# Patient Record
Sex: FEMALE | Race: WHITE | Employment: OTHER | URBAN - METROPOLITAN AREA
[De-identification: names, ages, dates, MRNs, and addresses within clinical notes are randomized per-mention and may not be internally consistent; named-entity substitution may affect disease eponyms.]

---

## 2022-03-15 ENCOUNTER — APPOINTMENT (OUTPATIENT)
Dept: GENERAL RADIOLOGY | Age: 67
End: 2022-03-15
Payer: COMMERCIAL

## 2022-03-15 ENCOUNTER — HOSPITAL ENCOUNTER (EMERGENCY)
Age: 67
Discharge: HOME | End: 2022-03-16
Attending: EMERGENCY MEDICINE
Payer: COMMERCIAL

## 2022-03-15 VITALS
RESPIRATION RATE: 18 BRPM | TEMPERATURE: 98.4 F | SYSTOLIC BLOOD PRESSURE: 133 MMHG | DIASTOLIC BLOOD PRESSURE: 56 MMHG | HEART RATE: 84 BPM | OXYGEN SATURATION: 94 %

## 2022-03-15 DIAGNOSIS — S62.101A RIGHT WRIST FRACTURE, CLOSED, INITIAL ENCOUNTER: Primary | ICD-10-CM

## 2022-03-15 PROCEDURE — 73100 X-RAY EXAM OF WRIST: CPT

## 2022-03-15 PROCEDURE — 6360000002 HC RX W HCPCS: Performed by: PHYSICIAN ASSISTANT

## 2022-03-15 PROCEDURE — 96372 THER/PROPH/DIAG INJ SC/IM: CPT

## 2022-03-15 PROCEDURE — 2500000003 HC RX 250 WO HCPCS: Performed by: EMERGENCY MEDICINE

## 2022-03-15 PROCEDURE — 2500000003 HC RX 250 WO HCPCS: Performed by: PHYSICIAN ASSISTANT

## 2022-03-15 PROCEDURE — 99283 EMERGENCY DEPT VISIT LOW MDM: CPT

## 2022-03-15 PROCEDURE — 6360000002 HC RX W HCPCS: Performed by: EMERGENCY MEDICINE

## 2022-03-15 RX ORDER — LIDOCAINE HYDROCHLORIDE 20 MG/ML
5 INJECTION, SOLUTION INFILTRATION; PERINEURAL ONCE
Status: COMPLETED | OUTPATIENT
Start: 2022-03-15 | End: 2022-03-15

## 2022-03-15 RX ORDER — FENTANYL CITRATE 50 UG/ML
50 INJECTION, SOLUTION INTRAMUSCULAR; INTRAVENOUS ONCE
Status: DISCONTINUED | OUTPATIENT
Start: 2022-03-15 | End: 2022-03-15

## 2022-03-15 RX ORDER — HYDROMORPHONE HCL 110MG/55ML
2 PATIENT CONTROLLED ANALGESIA SYRINGE INTRAVENOUS ONCE
Status: COMPLETED | OUTPATIENT
Start: 2022-03-15 | End: 2022-03-15

## 2022-03-15 RX ORDER — LIDOCAINE HYDROCHLORIDE 10 MG/ML
10 INJECTION, SOLUTION EPIDURAL; INFILTRATION; INTRACAUDAL; PERINEURAL ONCE
Status: COMPLETED | OUTPATIENT
Start: 2022-03-15 | End: 2022-03-15

## 2022-03-15 RX ADMIN — LIDOCAINE HYDROCHLORIDE 10 ML: 10 INJECTION, SOLUTION EPIDURAL; INFILTRATION; INTRACAUDAL; PERINEURAL at 23:51

## 2022-03-15 RX ADMIN — HYDROMORPHONE HYDROCHLORIDE 2 MG: 2 INJECTION, SOLUTION INTRAMUSCULAR; INTRAVENOUS; SUBCUTANEOUS at 21:30

## 2022-03-15 RX ADMIN — LIDOCAINE HYDROCHLORIDE 5 ML: 20 INJECTION, SOLUTION INFILTRATION; PERINEURAL at 20:29

## 2022-03-15 RX ADMIN — HYDROMORPHONE HYDROCHLORIDE 2 MG: 2 INJECTION, SOLUTION INTRAMUSCULAR; INTRAVENOUS; SUBCUTANEOUS at 20:28

## 2022-03-15 ASSESSMENT — PAIN SCALES - WONG BAKER: WONGBAKER_NUMERICALRESPONSE: 10

## 2022-03-15 ASSESSMENT — PAIN - FUNCTIONAL ASSESSMENT
PAIN_FUNCTIONAL_ASSESSMENT: 0-10
PAIN_FUNCTIONAL_ASSESSMENT: PREVENTS OR INTERFERES WITH MANY ACTIVE NOT PASSIVE ACTIVITIES

## 2022-03-15 ASSESSMENT — PAIN SCALES - GENERAL
PAINLEVEL_OUTOF10: 10

## 2022-03-15 ASSESSMENT — PAIN DESCRIPTION - FREQUENCY: FREQUENCY: CONTINUOUS

## 2022-03-15 ASSESSMENT — PAIN DESCRIPTION - PAIN TYPE: TYPE: ACUTE PAIN

## 2022-03-15 ASSESSMENT — PAIN DESCRIPTION - ONSET: ONSET: PROGRESSIVE

## 2022-03-15 ASSESSMENT — PAIN DESCRIPTION - LOCATION: LOCATION: WRIST

## 2022-03-15 ASSESSMENT — PAIN DESCRIPTION - PROGRESSION: CLINICAL_PROGRESSION: NOT CHANGED

## 2022-03-15 ASSESSMENT — PAIN DESCRIPTION - ORIENTATION: ORIENTATION: RIGHT

## 2022-03-15 ASSESSMENT — PAIN DESCRIPTION - DESCRIPTORS: DESCRIPTORS: CONSTANT

## 2022-03-16 PROCEDURE — 6370000000 HC RX 637 (ALT 250 FOR IP): Performed by: PHYSICIAN ASSISTANT

## 2022-03-16 RX ORDER — OXYCODONE HYDROCHLORIDE 5 MG/1
5 TABLET ORAL EVERY 6 HOURS PRN
Qty: 20 TABLET | Refills: 0 | Status: SHIPPED | OUTPATIENT
Start: 2022-03-16 | End: 2022-03-21

## 2022-03-16 RX ORDER — ONDANSETRON 4 MG/1
4 TABLET, ORALLY DISINTEGRATING ORAL ONCE
Status: COMPLETED | OUTPATIENT
Start: 2022-03-16 | End: 2022-03-16

## 2022-03-16 RX ORDER — ONDANSETRON 4 MG/1
TABLET, ORALLY DISINTEGRATING ORAL
Status: DISCONTINUED
Start: 2022-03-16 | End: 2022-03-16 | Stop reason: HOSPADM

## 2022-03-16 RX ADMIN — ONDANSETRON 4 MG: 4 TABLET, ORALLY DISINTEGRATING ORAL at 00:14

## 2022-03-16 ASSESSMENT — ENCOUNTER SYMPTOMS
ABDOMINAL PAIN: 0
VOMITING: 0
SORE THROAT: 0
DIARRHEA: 0
SHORTNESS OF BREATH: 0
NAUSEA: 0
COUGH: 0
WHEEZING: 0

## 2022-03-16 NOTE — ED NOTES
Pt has d/c order. D/C instructions given. Prescriptions given. Pt verbalized understanding. Pt out to lobby.          Nancy Leavitt RN  03/16/22 2190

## 2022-03-16 NOTE — ED PROVIDER NOTES
ED Attending Attestation Note     Date of evaluation: 3/15/2022    This patient was seen by the advance practice provider. I have seen and examined the patient, agree with the workup, evaluation, management and diagnosis. The care plan has been discussed. My assessment reveals deformity of the right wrist with an abrasion on the flexor surface of the wrist but no laceration.   Present for reduction and splinting of wrist.     Anastacia Malave MD  03/15/22 2041

## 2022-03-16 NOTE — ED PROVIDER NOTES
[address] ENCOUNTER          PHYSICIAN ASSISTANT NOTE       Date of evaluation: 3/15/2022    Chief Complaint     Wrist Injury (fell in the basement; demormity to right wrist)      History of Present Illness     Terri Boggs is a 75 y.o. female with a past medical history as noted below who presents to the Emergency Department with a complaint of right wrist pain with concern for fracture. The patient reports that she slipped on a wet surface in her basement and sustained a mechanical fall, landing on her outstretched right hand/wrist.  Patient reports that she experienced immediate pain noted deformity and requested that her family bring her to the emergency department for evaluation. Patient is complaining of 10 out of 10 pain of the right wrist which is constant and throbbing, though diminished somewhat if she is able to hold the wrist still. She is able to splint it with her opposite hand in an upright position. Notes slight tingling of the middle and ring finger without loss of sensation. No prior injuries or surgery to this wrist or arm. She has not taken anything for her symptoms. She did not strike her head or torso. No loss of consciousness during the fall. No other reported injuries. Review of Systems     Review of Systems   Constitutional: Negative for chills, diaphoresis and fever. HENT: Negative for congestion and sore throat. Respiratory: Negative for cough, shortness of breath and wheezing. Cardiovascular: Negative for chest pain, palpitations and leg swelling. Gastrointestinal: Negative for abdominal pain, diarrhea, nausea and vomiting. Genitourinary: Negative for dysuria. Musculoskeletal: Positive for joint swelling. Negative for myalgias. Right wrist pain and deformity   Skin: Negative for rash. Neurological: Negative for dizziness, seizures, weakness and headaches. Hematological: Does not bruise/bleed easily. understanding: patient states understanding of the procedure being performed  Patient consent: the patient's understanding of the procedure matches consent given  Patient identity confirmed: verbally with patient and arm band  Injury location: wrist  Location details: right wrist  Injury type: fracture  Fracture type: distal radius  Pre-procedure distal perfusion: normal  Pre-procedure neurological function: normal  Pre-procedure range of motion: reduced  Anesthesia: hematoma block    Anesthesia:  Local anesthesia used: yes  Local Anesthetic: lidocaine 2% without epinephrine  Anesthetic total: 20 mL    Sedation:  Patient sedated: no    Manipulation performed: yes  Skin traction used: no  Skeletal traction used: yes (Finger-traps)  Reduction successful: yes (Improvement of alignment, but still slightly incomplete.)  X-ray confirmed reduction: yes  Immobilization: splint  Splint type: sugar tong  Supplies used: Ortho-Glass  Post-procedure neurovascular assessment: post-procedure neurovascularly intact  Post-procedure distal perfusion: normal  Post-procedure neurological function: normal  Post-procedure range of motion: improved  Patient tolerance: patient tolerated the procedure well with no immediate complications    Ortho Injury  Performed by: MELLISSA Anaya  Authorized by: Fernie La MD   Consent: Verbal consent obtained.   Risks and benefits: risks, benefits and alternatives were discussed  Consent given by: patient  Patient understanding: patient states understanding of the procedure being performed  Patient consent: the patient's understanding of the procedure matches consent given  Patient identity confirmed: verbally with patient and arm band  Injury location: wrist  Location details: right wrist  Injury type: fracture  Fracture type: distal radius  Pre-procedure neurovascular assessment: neurovascularly intact  Pre-procedure distal perfusion: normal  Pre-procedure neurological function: normal  Pre-procedure range of motion: reduced  Anesthesia: hematoma block    Anesthesia:  Local anesthesia used: yes  Local Anesthetic: lidocaine 1% without epinephrine  Anesthetic total: 8 mL    Sedation:  Patient sedated: no    Manipulation performed: yes  Skin traction used: no  Skeletal traction used: no  Reduction successful: yes (Improved alignement of the radial fragments)  X-ray confirmed reduction: yes  Immobilization: splint  Splint type: sugar tong  Supplies used: Ortho-Glass  Post-procedure neurovascular assessment: post-procedure neurovascularly intact  Post-procedure distal perfusion: normal  Post-procedure neurological function: normal  Post-procedure range of motion: unchanged  Patient tolerance: patient tolerated the procedure well with no immediate complications  Comments: Wrist would revert back to previous reduction state if traction not tightly held- some improvement with this second reduction and new splint, however,this is most likely the best positioning tonight. Remains neurovascularly intact. MEDICAL DECISION MAKING     Mitali Hicks is admitted to the Emergency Department for evaluation of her chief complaint as described in the history of present illness. Complete history and physical was performed by me and my attending. Nursing notes, past medical history, surgical history, family history and social history were reviewed and addressed in the HPI. Johanna Mata is a 75 y.o. female who presents to the emergency department with a complaint of right wrist pain after fall. The patient slipped on a wet surface in her basement and fell on her right outstretched hand. No loss of consciousness. Did not strike head or thorax. She is not on any anticoagulant therapy. Immediate pain and deformity of the right wrist.  Came to the emergency department by private vehicle.   On arrival to the emergency department, the patient is slightly hypertensive but otherwise hemodynamically stable. Physical examination reveals deformed right wrist with palmar surface, superficial abrasion. The extremity is neurovascularly intact, though she does report some slight tingling of the third and fourth fingers. Cap refill less than 2 seconds. Given the apparent instability of the fracture as suggested by the level of deformity, the patient's hand was placed in finger traps to allow for musculoskeletal traction by gravity and a hematoma block was performed with 2% lidocaine. The patient was also administered IM Dilaudid. Initial x-rays had demonstrated a comminuted, displaced, intra-articular distal radius fracture with dorsal displacement of the distal fragment. The patient's hand was left in finger traps for period of approximately 40 minutes, after which repeat evaluation shows some interval reduction of the fracture. The patient was still tolerating the symptoms very well, so manual manipulation was performed by myself and the attending and a splint applied. The extremity was neurovascularly intact post reduction. Post reduction films demonstrate an improvement in alignment of the fracture, though there is still 1 shaft width dorsal and half shaft width radial displacement with some overriding of the distal fragment. A second attempt at reduction was performed after an additional hematoma block with 1% lidocaine was administered. Again, manual manipulation was performed with perceived improvement of the alignment of the extremity. A new splint was applied and postreduction films performed which demonstrate a decrease to half shaft width dorsal displacement with overall improved alignment. I do not feel that any further improvement in alignment can be achieved with any further manipulation. The extremity continues to be neurovascularly intact distally after the second reduction.   The patient will be prescribed a short course of narcotic analgesia and will follow up in the morning with orthopedic surgery for likely surgical reduction and fixation. Strict return precautions for the development of worsening symptoms, loss of sensation or function or other symptoms concerning to the patient requiring physician evaluation. I discussed this plan at length the patient who verbalizes understanding and is in agreement. The patient is currently stable and will be discharged home for continued self-care. Please see patient's AVS for additional discharge instructions. The patient was seen and evaluated by myself and the attending physician, Diane Chery MD, who agrees with my assessment, treatment and plan. Clinical Impression     1. Right wrist fracture, closed, initial encounter        Disposition     DISPOSITION Decision To Discharge 03/16/2022 12:06:33 AM        Paul Ledbetter. JEANNE De La Paz  12:32 AM    Relevant History and Diagnostic Information     Past Medical, Surgical, Family, and Social History     She has a past medical history of Breast cancer ([facility] [address] She has a past surgical history that includes Breast biopsy; Appendectomy; and Breast lumpectomy. Her family history includes Alcohol Abuse in her father; Emphysema in her mother. She reports that she has never smoked. She has never used smokeless tobacco.    Medications     Discharge Medication List as of 3/16/2022 12:10 AM      CONTINUE these medications which have NOT CHANGED    Details   cephALEXin (KEFLEX) 250 MG capsule Take 250 mg by mouth dailyHistorical Med      solifenacin (VESICARE) 5 MG tablet Take 10 mg by mouth daily      Calcium Carbonate-Vitamin D (OS-CLAUDIA 500 + D PO) Take 1 tablet by mouth daily. Allergies     She has No Known Allergies. ED Course     Nursing Notes, Past Medical Hx, Past Surgical Hx, Social Hx,Allergies, and Family Hx were reviewed.     Patient was given the following medications:  Orders Placed This Encounter   Medications    DISCONTD: fentaNYL (SUBLIMAZE) injection 50 mcg    HYDROmorphone (DILAUDID) injection 2 mg    lidocaine 2 % injection 5 mL    HYDROmorphone (DILAUDID) injection 2 mg    lidocaine PF 1 % injection 10 mL    oxyCODONE (ROXICODONE) 5 MG immediate release tablet     Sig: Take 1 tablet by mouth every 6 hours as needed (Pain not well managed by other medications) for up to 5 days. Dispense:  20 tablet     Refill:  0    ondansetron (ZOFRAN-ODT) disintegrating tablet 4 mg    ondansetron (ZOFRAN-ODT) 4 MG disintegrating tablet     Cassie Cole: cabinet override       Diagnostic Results     RECENT VITALS:  BP: (!) 133/56,Temp: 98.4 °F (36.9 °C), Pulse: 84, Resp: 18, SpO2: 94 %     RADIOLOGY:  XR WRIST RIGHT (2 VIEWS)   Final Result   Impression:   Distal radius fracture with improved alignment as above. XR WRIST RIGHT (2 VIEWS)   Final Result   Impression:   Distal radius fracture in splint. Dorsal and radial displacement as well as overriding of the distal fragment. XR WRIST LEFT (2 VIEWS)   Final Result   Impression:   Comminuted and displaced intra-articular distal radius fracture. LABS:   No results found for this visit on 03/15/22. CONSULTS:  None    PATIENT REFERRED TO:  Obie Guillermo MD  [id number] [place], [place] [address]y    Schedule an appointment as soon as possible for a visit   Call first thing in the morning to set up an appointment for follow-up. [facility] Emergency Department  [facility] [address]  [address]  [phone]  Go to   If symptoms worsen      DISCHARGE MEDICATIONS:  Discharge Medication List as of 3/16/2022 12:10 AM      START taking these medications    Details   oxyCODONE (ROXICODONE) 5 MG immediate release tablet Take 1 tablet by mouth every 6 hours as needed (Pain not well managed by other medications) for up to 5 days. , Disp-20 tablet, [id number]                [address] [place] [address]  03/16/22 5312

## 2022-03-17 ENCOUNTER — ANESTHESIA EVENT (OUTPATIENT)
Dept: OPERATING ROOM | Age: 67
End: 2022-03-17
Payer: COMMERCIAL

## 2022-03-17 NOTE — PROGRESS NOTES
Place patient label inside box (if no patient label, complete below)  Name:  :  MR#:   Bianca Hughes / PROCEDURE  1. I (we), Aby Rowe (Patient Name) authorize Ute Garber MD (Provider / Bishnu Bishop) and/or such assistants as may be selected by him/her, to perform the following operation/procedure(s): OPERATIVE FIXATION WITH OPEN REDUCTION INTERNAL FIXATION RIGHT DISTAL RADIUS FRACTURE; RIGHT CARPAL TUNNEL RELEASE ANY INDICATED PROCEDURES        Note: If unable to obtain consent prior to an emergent procedure, document the emergent reason in the medical record. This procedure has been explained to my (our) satisfaction and included in the explanation was:  A) The intended benefit, nature, and extent of the procedure to be performed;  B) The significant risks involved and the probability of success;  C) Alternative procedures and methods of treatment;  D) The dangers and probable consequences of such alternatives (including no procedure or treatment); E) The expected consequences of the procedure on my future health;  F) Whether other qualified individuals would be performing important surgical tasks and/or whether  would be present to advise or support the procedure. I (we) understand that there are other risks of infection and other serious complications in the pre-operative/procedural and postoperative/procedural stages of my (our) care. I (we) have asked all of the questions which I (we) thought were important in deciding whether or not to undergo treatment or diagnosis. These questions have been answered to my (our) satisfaction. I (we) understand that no assurance can be given that the procedure will be a success, and no guarantee or warranty of success has been given to me (us).     2. It has been explained to me (us) that during the course of the operation/procedure, unforeseen conditions may be revealed that necessitate extension of the original procedure(s) or different procedure(s) than those set forth in Paragraph 1. I (we) authorize and request that the above-named physician, his/her assistants or his/her designees, perform procedures as necessary and desirable if deemed to be in my (our) best interest.     Revised 8/2/2021                                                                          Page 1 of 2                   3. I acknowledge that health care personnel may be observing this procedure for the purpose of medical education or other specified purposes as may be necessary as requested and/or approved by my (our) physician. 4. I (we) consent to the disposal by the hospital Pathologist of the removed tissue, parts or organs in accordance with hospital policy. 5. I do ____ do not ____ consent to the use of a local infiltration pain blocking agent that will be used by my provider/surgical provider to help alleviate pain during my procedure. 6. I do ____ do not ____ consent to an emergent blood transfusion in the case of a life-threatening situation that requires blood components to be administered. This consent is valid for 24 hours from the beginning of the procedure. 7. This patient does ____ or does not ____ currently have a DNR status/order. If DNR order is in place, obtain Addendum to the Surgical Consent for ALL Patients with a DNR Order to address tahir-operative status for limited intervention or DNR suspension.      8. I have read and fully understand the above Consent for Operation/Procedure and that all blanks were completed before I signed the consent.   _____________________________       _____________________      ____/____am/pm  Signature of Patient or legal representative      Printed Name / Relationship            Date / Time   ____________________________       _____________________      ____/____am/pm  Witness to Signature                                    Printed Name                    Date / Time     If patient is unable to sign or is a minor, complete the following)  Patient is a minor, ____ years of age, or unable to sign because:   ______________________________________________________________________________________________    Nur If a phone consent is obtained, consent will be documented by using two health care professionals, each affirming that the consenting party has no questions and gives consent for the procedure discussed with the physician/provider.   _____________________          ____________________       _____/_____am/pm   2nd witness to phone consent        Printed name           Date / Time    Informed Consent:  I have provided the explanation described above in section 1 to the patient and/or legal representative.  I have provided the patient and/or legal representative with an opportunity to ask any questions about the proposed operation/procedure.   ___________________________          ____________________         ____/____am/pm  Provider / Proceduralist                            Printed name            Date / Time  Revised 8/2/2021                                                                      Page 2 of 2

## 2022-03-17 NOTE — PROGRESS NOTES
PRE-OP INSTRUCTIONS FOR THE SURGICAL PATIENT YOU ARE UNABLE TO MAKE CONTACT FOR AN INTERVIEW:    All patients having surgery or anesthesia are required to be Covid tested OR to have been vaccinated at least 14 days prior to your procedure. It is very important to return our call to [phone] and notify the staff of your last vaccination date otherwise you will be required to complete Covid PCR test within the 5-6 days prior to surgery & quarantine. The results will need to be faxed to PreAdmission Testing at [phone]. 1. Follow all instructions provided to you from your surgeons office, including your ARRIVAL TIME. 2. Enter the MAIN entrance located on [address] and report to the desk. 3. Bring your insurance & photo ID with you. You may also be asked to pay a co-pay, as you may want to bring a check or credit card with you. 4. Leave all other valuables at home. 5. Arrange for someone to drive you home and be with you for the first 24 hours after discharge. 6. Bring your medication list with you day of surgery with doses and frequency listed (including over the counter medications)  7. You must contact your surgeon for Instructions regarding:              - ALL medication instructions, especially if taking blood thinners, aspirin, or diabetic medication.         -Bariatric patients call surgeon if on diabetic medications as some need to be stopped 1 week preop  - IF  there is a change in your physical condition such as a cold, fever, rash, cuts, sores or any other infection, especially near your surgical site. 8. A Pre-op History and Physical for surgery MUST be completed by your Physician or an Urgent Care within 30 days of your procedure date. Please bring a copy with you on the day of your procedure and along with any other testing performed. 9. DO NOT EAT ANYTHING eight hours prior to arrival for surgery.   May have up to 8 ounces of water 4 hours prior to arrival for surgery. NOTE: ALL Gastric, Bariatric and Bowel surgery patients MUST follow their surgeon's instructions. 10. No gum, candy, mints, or ice chips day of procedure. 11. Please refrain from drinking alcohol the day before or day of your procedure. 12. Please do not smoke the day of your procedure. 13. Dress in loose, comfortable clothing appropriate for redressing after your procedure. Do not wear jewelry (including body piercings), make-up, fingernail polish, lotion, powders or metal hairclips. 15. Contacts will need to be removed prior to surgery. You may want to bring your eye glasses to wear immediately before and after surgery. 14. Dentures will need to be removed before your procedure. 13. Bring cases for your glasses, contacts, dentures, or hearing aids to protect them while you are in surgery. 16. If you use a CPAP, please bring it with you on the day of your procedure. 17. Do not shave or wax for 72 hours prior to procedure near your operative site  18. FOR WOMAN OF CHILDBEARING AGE ONLY- please make sure we can collect a urine sample on arrival.     If you have further questions, you may contact your surgeon's office or us at [phone]     Left instructions on patient's voicemail.     Toshia Wills RN.3/17/2022 .1:56 PM

## 2022-03-18 ENCOUNTER — ANESTHESIA (OUTPATIENT)
Dept: OPERATING ROOM | Age: 67
End: 2022-03-18
Payer: COMMERCIAL

## 2022-03-18 ENCOUNTER — HOSPITAL ENCOUNTER (OUTPATIENT)
Age: 67
Setting detail: OUTPATIENT SURGERY
Discharge: HOME | End: 2022-03-18
Attending: ORTHOPAEDIC SURGERY | Admitting: ORTHOPAEDIC SURGERY
Payer: COMMERCIAL

## 2022-03-18 VITALS
WEIGHT: 165 LBS | HEIGHT: 67 IN | SYSTOLIC BLOOD PRESSURE: 117 MMHG | TEMPERATURE: 98.1 F | BODY MASS INDEX: 25.9 KG/M2 | HEART RATE: 72 BPM | DIASTOLIC BLOOD PRESSURE: 83 MMHG | RESPIRATION RATE: 18 BRPM | OXYGEN SATURATION: 94 %

## 2022-03-18 VITALS — TEMPERATURE: 97 F | SYSTOLIC BLOOD PRESSURE: 102 MMHG | DIASTOLIC BLOOD PRESSURE: 61 MMHG | OXYGEN SATURATION: 98 %

## 2022-03-18 DIAGNOSIS — S52.571A OTHER CLOSED INTRA-ARTICULAR FRACTURE OF DISTAL END OF RIGHT RADIUS, INITIAL ENCOUNTER: Primary | ICD-10-CM

## 2022-03-18 LAB
EKG ATRIAL RATE: 78 BPM
EKG DIAGNOSIS: NORMAL
EKG P AXIS: 8 DEGREES
EKG P-R INTERVAL: 152 MS
EKG Q-T INTERVAL: 390 MS
EKG QRS DURATION: 100 MS
EKG QTC CALCULATION (BAZETT): 444 MS
EKG R AXIS: -42 DEGREES
EKG T AXIS: 27 DEGREES
EKG VENTRICULAR RATE: 78 BPM

## 2022-03-18 PROCEDURE — 7100000011 HC PHASE II RECOVERY - ADDTL 15 MIN: Performed by: ORTHOPAEDIC SURGERY

## 2022-03-18 PROCEDURE — 2580000003 HC RX 258: Performed by: ANESTHESIOLOGY

## 2022-03-18 PROCEDURE — 6360000002 HC RX W HCPCS: Performed by: ANESTHESIOLOGY

## 2022-03-18 PROCEDURE — 3600000014 HC SURGERY LEVEL 4 ADDTL 15MIN: Performed by: ORTHOPAEDIC SURGERY

## 2022-03-18 PROCEDURE — 3700000001 HC ADD 15 MINUTES (ANESTHESIA): Performed by: ORTHOPAEDIC SURGERY

## 2022-03-18 PROCEDURE — 64415 NJX AA&/STRD BRCH PLXS IMG: CPT | Performed by: ANESTHESIOLOGY

## 2022-03-18 PROCEDURE — 3600000004 HC SURGERY LEVEL 4 BASE: Performed by: ORTHOPAEDIC SURGERY

## 2022-03-18 PROCEDURE — 2709999900 HC NON-CHARGEABLE SUPPLY: Performed by: ORTHOPAEDIC SURGERY

## 2022-03-18 PROCEDURE — 7100000010 HC PHASE II RECOVERY - FIRST 15 MIN: Performed by: ORTHOPAEDIC SURGERY

## 2022-03-18 PROCEDURE — 6360000002 HC RX W HCPCS: Performed by: NURSE ANESTHETIST, CERTIFIED REGISTERED

## 2022-03-18 PROCEDURE — A4217 STERILE WATER/SALINE, 500 ML: HCPCS | Performed by: ORTHOPAEDIC SURGERY

## 2022-03-18 PROCEDURE — 3700000000 HC ANESTHESIA ATTENDED CARE: Performed by: ORTHOPAEDIC SURGERY

## 2022-03-18 PROCEDURE — C1713 ANCHOR/SCREW BN/BN,TIS/BN: HCPCS | Performed by: ORTHOPAEDIC SURGERY

## 2022-03-18 PROCEDURE — 7100000000 HC PACU RECOVERY - FIRST 15 MIN: Performed by: ORTHOPAEDIC SURGERY

## 2022-03-18 PROCEDURE — 2580000003 HC RX 258: Performed by: ORTHOPAEDIC SURGERY

## 2022-03-18 PROCEDURE — 7100000001 HC PACU RECOVERY - ADDTL 15 MIN: Performed by: ORTHOPAEDIC SURGERY

## 2022-03-18 DEVICE — K WIRE FIX DIA1.6MM S STL FOR DST VOLAR PLATING SYS: Type: IMPLANTABLE DEVICE | Site: WRIST | Status: FUNCTIONAL

## 2022-03-18 DEVICE — SCREW BNE L18MM DIA2.7MM DST RAD LOK FULL THRD SQ DRV HD LO: Type: IMPLANTABLE DEVICE | Site: WRIST | Status: FUNCTIONAL

## 2022-03-18 DEVICE — SCREW BNE L16MM DIA2.7MM DST RAD LOK FULL THRD SQ DRV HD LO: Type: IMPLANTABLE DEVICE | Site: WRIST | Status: FUNCTIONAL

## 2022-03-18 DEVICE — PLATE BONE W24XL51MM 12 HOLE RIGHT DST VOLAR RDL WRIST STNDR: Type: IMPLANTABLE DEVICE | Site: WRIST | Status: FUNCTIONAL

## 2022-03-18 DEVICE — SCREW BNE L14MM DIA2.7MM DST VOLAR RAD NONLOCKING FULL THRD: Type: IMPLANTABLE DEVICE | Site: WRIST | Status: FUNCTIONAL

## 2022-03-18 DEVICE — SCREW BNE L20MM DIA2.7MM DST RAD LOK FULL THRD SQ DRV HD LO: Type: IMPLANTABLE DEVICE | Site: WRIST | Status: FUNCTIONAL

## 2022-03-18 DEVICE — SCREW BNE L13MM DIA2.7MM DST RAD NONLOCKING FULL THRD SQ: Type: IMPLANTABLE DEVICE | Site: WRIST | Status: FUNCTIONAL

## 2022-03-18 RX ORDER — SODIUM CHLORIDE 9 MG/ML
25 INJECTION, SOLUTION INTRAVENOUS PRN
Status: DISCONTINUED | OUTPATIENT
Start: 2022-03-18 | End: 2022-03-18 | Stop reason: HOSPADM

## 2022-03-18 RX ORDER — IPRATROPIUM BROMIDE AND ALBUTEROL SULFATE 2.5; .5 MG/3ML; MG/3ML
1 SOLUTION RESPIRATORY (INHALATION)
Status: DISCONTINUED | OUTPATIENT
Start: 2022-03-18 | End: 2022-03-18 | Stop reason: HOSPADM

## 2022-03-18 RX ORDER — FENTANYL CITRATE 50 UG/ML
50 INJECTION, SOLUTION INTRAMUSCULAR; INTRAVENOUS EVERY 5 MIN PRN
Status: DISCONTINUED | OUTPATIENT
Start: 2022-03-18 | End: 2022-03-18 | Stop reason: HOSPADM

## 2022-03-18 RX ORDER — MAGNESIUM HYDROXIDE 1200 MG/15ML
LIQUID ORAL CONTINUOUS PRN
Status: COMPLETED | OUTPATIENT
Start: 2022-03-18 | End: 2022-03-18

## 2022-03-18 RX ORDER — PROPOFOL 10 MG/ML
INJECTION, EMULSION INTRAVENOUS PRN
Status: DISCONTINUED | OUTPATIENT
Start: 2022-03-18 | End: 2022-03-18 | Stop reason: SDUPTHER

## 2022-03-18 RX ORDER — MIDAZOLAM HYDROCHLORIDE 1 MG/ML
2 INJECTION INTRAMUSCULAR; INTRAVENOUS ONCE
Status: COMPLETED | OUTPATIENT
Start: 2022-03-18 | End: 2022-03-18

## 2022-03-18 RX ORDER — ONDANSETRON 2 MG/ML
INJECTION INTRAMUSCULAR; INTRAVENOUS PRN
Status: DISCONTINUED | OUTPATIENT
Start: 2022-03-18 | End: 2022-03-18 | Stop reason: SDUPTHER

## 2022-03-18 RX ORDER — ONDANSETRON 2 MG/ML
4 INJECTION INTRAMUSCULAR; INTRAVENOUS
Status: DISCONTINUED | OUTPATIENT
Start: 2022-03-18 | End: 2022-03-18 | Stop reason: HOSPADM

## 2022-03-18 RX ORDER — DEXAMETHASONE SODIUM PHOSPHATE 4 MG/ML
4 INJECTION, SOLUTION INTRA-ARTICULAR; INTRALESIONAL; INTRAMUSCULAR; INTRAVENOUS; SOFT TISSUE EVERY 6 HOURS
Status: DISCONTINUED | OUTPATIENT
Start: 2022-03-18 | End: 2022-03-18 | Stop reason: HOSPADM

## 2022-03-18 RX ORDER — DEXAMETHASONE SODIUM PHOSPHATE 4 MG/ML
INJECTION, SOLUTION INTRA-ARTICULAR; INTRALESIONAL; INTRAMUSCULAR; INTRAVENOUS; SOFT TISSUE PRN
Status: DISCONTINUED | OUTPATIENT
Start: 2022-03-18 | End: 2022-03-18 | Stop reason: SDUPTHER

## 2022-03-18 RX ORDER — METOCLOPRAMIDE HYDROCHLORIDE 5 MG/ML
10 INJECTION INTRAMUSCULAR; INTRAVENOUS
Status: DISCONTINUED | OUTPATIENT
Start: 2022-03-18 | End: 2022-03-18 | Stop reason: HOSPADM

## 2022-03-18 RX ORDER — OXYCODONE HYDROCHLORIDE 5 MG/1
10 TABLET ORAL PRN
Status: DISCONTINUED | OUTPATIENT
Start: 2022-03-18 | End: 2022-03-18 | Stop reason: HOSPADM

## 2022-03-18 RX ORDER — OXYCODONE HYDROCHLORIDE 5 MG/1
5 TABLET ORAL PRN
Status: DISCONTINUED | OUTPATIENT
Start: 2022-03-18 | End: 2022-03-18 | Stop reason: HOSPADM

## 2022-03-18 RX ORDER — LIDOCAINE HYDROCHLORIDE 20 MG/ML
INJECTION, SOLUTION INTRAVENOUS PRN
Status: DISCONTINUED | OUTPATIENT
Start: 2022-03-18 | End: 2022-03-18 | Stop reason: SDUPTHER

## 2022-03-18 RX ORDER — SODIUM CHLORIDE 0.9 % (FLUSH) 0.9 %
5-40 SYRINGE (ML) INJECTION PRN
Status: DISCONTINUED | OUTPATIENT
Start: 2022-03-18 | End: 2022-03-18 | Stop reason: HOSPADM

## 2022-03-18 RX ORDER — ROPIVACAINE HYDROCHLORIDE 5 MG/ML
30 INJECTION, SOLUTION EPIDURAL; INFILTRATION; PERINEURAL ONCE
Status: DISCONTINUED | OUTPATIENT
Start: 2022-03-18 | End: 2022-03-18 | Stop reason: HOSPADM

## 2022-03-18 RX ORDER — SODIUM CHLORIDE, SODIUM LACTATE, POTASSIUM CHLORIDE, CALCIUM CHLORIDE 600; 310; 30; 20 MG/100ML; MG/100ML; MG/100ML; MG/100ML
INJECTION, SOLUTION INTRAVENOUS CONTINUOUS
Status: DISCONTINUED | OUTPATIENT
Start: 2022-03-18 | End: 2022-03-18 | Stop reason: HOSPADM

## 2022-03-18 RX ORDER — LABETALOL HYDROCHLORIDE 5 MG/ML
10 INJECTION, SOLUTION INTRAVENOUS
Status: DISCONTINUED | OUTPATIENT
Start: 2022-03-18 | End: 2022-03-18 | Stop reason: HOSPADM

## 2022-03-18 RX ORDER — ROPIVACAINE HYDROCHLORIDE 5 MG/ML
INJECTION, SOLUTION EPIDURAL; INFILTRATION; PERINEURAL
Status: COMPLETED | OUTPATIENT
Start: 2022-03-18 | End: 2022-03-18

## 2022-03-18 RX ORDER — HYDROCODONE BITARTRATE AND ACETAMINOPHEN 5; 325 MG/1; MG/1
1 TABLET ORAL EVERY 6 HOURS PRN
Qty: 18 TABLET | Refills: 0 | Status: SHIPPED | OUTPATIENT
Start: 2022-03-18 | End: 2022-03-23

## 2022-03-18 RX ORDER — FENTANYL CITRATE 50 UG/ML
100 INJECTION, SOLUTION INTRAMUSCULAR; INTRAVENOUS ONCE
Status: COMPLETED | OUTPATIENT
Start: 2022-03-18 | End: 2022-03-18

## 2022-03-18 RX ORDER — SODIUM CHLORIDE 0.9 % (FLUSH) 0.9 %
5-40 SYRINGE (ML) INJECTION EVERY 12 HOURS SCHEDULED
Status: DISCONTINUED | OUTPATIENT
Start: 2022-03-18 | End: 2022-03-18 | Stop reason: HOSPADM

## 2022-03-18 RX ORDER — LORAZEPAM 2 MG/ML
0.5 INJECTION INTRAMUSCULAR
Status: DISCONTINUED | OUTPATIENT
Start: 2022-03-18 | End: 2022-03-18 | Stop reason: HOSPADM

## 2022-03-18 RX ADMIN — DEXAMETHASONE SODIUM PHOSPHATE 8 MG: 4 INJECTION, SOLUTION INTRAMUSCULAR; INTRAVENOUS at 13:18

## 2022-03-18 RX ADMIN — SODIUM CHLORIDE, POTASSIUM CHLORIDE, SODIUM LACTATE AND CALCIUM CHLORIDE: 600; 310; 30; 20 INJECTION, SOLUTION INTRAVENOUS at 14:27

## 2022-03-18 RX ADMIN — SODIUM CHLORIDE, POTASSIUM CHLORIDE, SODIUM LACTATE AND CALCIUM CHLORIDE: 600; 310; 30; 20 INJECTION, SOLUTION INTRAVENOUS at 10:50

## 2022-03-18 RX ADMIN — ROPIVACAINE HYDROCHLORIDE 30 ML: 5 INJECTION, SOLUTION EPIDURAL; INFILTRATION; PERINEURAL at 11:44

## 2022-03-18 RX ADMIN — PROPOFOL 200 MG: 10 INJECTION, EMULSION INTRAVENOUS at 12:51

## 2022-03-18 RX ADMIN — MIDAZOLAM 2 MG: 1 INJECTION INTRAMUSCULAR; INTRAVENOUS at 11:39

## 2022-03-18 RX ADMIN — LIDOCAINE HYDROCHLORIDE 100 MG: 20 INJECTION, SOLUTION INTRAVENOUS at 12:51

## 2022-03-18 RX ADMIN — FENTANYL CITRATE 50 MCG: 50 INJECTION INTRAMUSCULAR; INTRAVENOUS at 11:40

## 2022-03-18 RX ADMIN — ONDANSETRON 4 MG: 2 INJECTION INTRAMUSCULAR; INTRAVENOUS at 14:53

## 2022-03-18 ASSESSMENT — PULMONARY FUNCTION TESTS
PIF_VALUE: 4
PIF_VALUE: 3
PIF_VALUE: 3
PIF_VALUE: 4
PIF_VALUE: 3
PIF_VALUE: 4
PIF_VALUE: 4
PIF_VALUE: 3
PIF_VALUE: 4
PIF_VALUE: 3
PIF_VALUE: 4
PIF_VALUE: 4
PIF_VALUE: 3
PIF_VALUE: 3
PIF_VALUE: 5
PIF_VALUE: 3
PIF_VALUE: 2
PIF_VALUE: 3
PIF_VALUE: 4
PIF_VALUE: 3
PIF_VALUE: 4
PIF_VALUE: 3
PIF_VALUE: 4
PIF_VALUE: 3
PIF_VALUE: 4
PIF_VALUE: 4
PIF_VALUE: 3
PIF_VALUE: 4
PIF_VALUE: 3
PIF_VALUE: 4
PIF_VALUE: 3
PIF_VALUE: 4
PIF_VALUE: 3
PIF_VALUE: 4
PIF_VALUE: 3
PIF_VALUE: 3
PIF_VALUE: 4
PIF_VALUE: 3
PIF_VALUE: 4
PIF_VALUE: 3
PIF_VALUE: 4
PIF_VALUE: 3
PIF_VALUE: 4
PIF_VALUE: 3
PIF_VALUE: 4
PIF_VALUE: 2
PIF_VALUE: 4
PIF_VALUE: 3
PIF_VALUE: 4
PIF_VALUE: 4
PIF_VALUE: 3
PIF_VALUE: 9
PIF_VALUE: 3
PIF_VALUE: 4
PIF_VALUE: 3
PIF_VALUE: 4
PIF_VALUE: 4
PIF_VALUE: 3
PIF_VALUE: 4
PIF_VALUE: 3
PIF_VALUE: 4
PIF_VALUE: 4
PIF_VALUE: 1
PIF_VALUE: 4
PIF_VALUE: 3
PIF_VALUE: 4
PIF_VALUE: 3
PIF_VALUE: 4
PIF_VALUE: 3
PIF_VALUE: 4
PIF_VALUE: 13
PIF_VALUE: 3
PIF_VALUE: 4
PIF_VALUE: 3
PIF_VALUE: 4
PIF_VALUE: 3
PIF_VALUE: 3
PIF_VALUE: 4
PIF_VALUE: 4
PIF_VALUE: 3
PIF_VALUE: 4
PIF_VALUE: 3
PIF_VALUE: 4

## 2022-03-18 ASSESSMENT — PAIN DESCRIPTION - DESCRIPTORS: DESCRIPTORS: ACHING

## 2022-03-18 ASSESSMENT — PAIN SCALES - GENERAL
PAINLEVEL_OUTOF10: 0

## 2022-03-18 ASSESSMENT — PAIN - FUNCTIONAL ASSESSMENT
PAIN_FUNCTIONAL_ASSESSMENT: PREVENTS OR INTERFERES WITH MANY ACTIVE NOT PASSIVE ACTIVITIES
PAIN_FUNCTIONAL_ASSESSMENT: 0-10

## 2022-03-18 ASSESSMENT — LIFESTYLE VARIABLES: SMOKING_STATUS: 0

## 2022-03-18 NOTE — PROGRESS NOTES
Ambulatory Surgery/Procedure Discharge Note    Vitals:    03/18/22 1630   BP: 117/83   Pulse: 72   Resp: 18   Temp: 98.1 °F (36.7 °C)   SpO2: 94%       In: 1535 [P.O.:250; I.V.:1285]  Out: 175 [Urine:150]    Restroom use offered before discharge. Yes/voided in BR on way to car    Pain assessment:  none  Pain Level: 0    Right arm sling on. Clean right arm dressing. Slight right finger movement. Right fingers are warm , pink with instant cap refill. Aware when has sensation returning to take pain medication. Discharge instructions reviewed. Patient and spouse in room educated, using the teach back method, about follow up instructions and discharge instructions. A completed copy of the AVS instructions given to patient and all questions answered. Home with 6 shower gloves and 3 ice bags. Walking to BR with steady gait      Patient discharged to home/self care.  Patient discharged via wheel chair by me to waiting family/S.O.       3/18/2022 5:04 PM

## 2022-03-18 NOTE — ANESTHESIA PRE PROCEDURE
Department of Anesthesiology  Preprocedure Note       Name:  Ml Urbina   Age:  75 y.o.  :  1955                                          MRN:  [id number]         Date:  3/18/2022      Surgeon: Mitali Christensen):  Danny Gallo MD    Procedure: Procedure(s):  OPERATIVE FIXATION WITH OPEN REDUCTION INTERNAL FIXATION RIGHT DISTAL RADIUS FRACTURE;  RIGHT CARPAL TUNNEL RELEASE ANY INDICATED PROCEDURES  . Medications prior to admission:   Prior to Admission medications    Medication Sig Start Date End Date Taking? Authorizing Provider   oxyCODONE (ROXICODONE) 5 MG immediate release tablet Take 1 tablet by mouth every 6 hours as needed (Pain not well managed by other medications) for up to 5 days.  3/16/22 3/21/22  MELLISSA Tatum   Wheat Dextrin (BENEFIBER) TABS Take 1 tablet by mouth daily    Historical Provider, MD   calcium carbonate 1500 (600 Ca) MG TABS tablet Take 1,500 mg by mouth 2 times daily    Historical Provider, MD   cephALEXin (KEFLEX) 250 MG capsule Take 250 mg by mouth daily    Historical Provider, MD   solifenacin (VESICARE) 5 MG tablet Take 10 mg by mouth daily    Historical Provider, MD       Current medications:    Current Facility-Administered Medications   Medication Dose Route Frequency Provider Last Rate Last Admin    lactated ringers infusion   IntraVENous Continuous Marycarmen Agrawal DO        ceFAZolin (ANCEF) 2000 mg in dextrose 5 % 50 mL IVPB  2,000 mg IntraVENous Once Danny Gallo MD        fentaNYL (SUBLIMAZE) injection 100 mcg  100 mcg IntraVENous Once Donato Funes MD        midazolam (VERSED) injection 2 mg  2 mg IntraVENous Once Donato Funes MD        ropivacaine (NAROPIN) 0.5% injection 30 mL  30 mL Infiltration Once Donato Funes MD        dexamethasone (DECADRON) injection 4 mg  4 mg IntraVENous Q6H Donato Funes MD           Allergies:  No Known Allergies    Problem List:    Patient Active Problem List   Diagnosis Code    Breast cancer of upper-outer quadrant of left female breast ([address] C50.412       Past Medical History:        Diagnosis Date    Breast cancer [facility])        Past Surgical History:        Procedure Laterality Date    APPENDECTOMY      BREAST BIOPSY      BREAST LUMPECTOMY         Social History:    Social History     Tobacco Use    Smoking status: Never Smoker    Smokeless tobacco: Never Used   Substance Use Topics    Alcohol use: Not on file                                Counseling given: Not Answered      Vital Signs (Current): There were no vitals filed for this visit. BP Readings from Last 3 Encounters:   03/15/22 (!) 133/56   08/15/17 122/76   08/02/16 126/78       NPO Status:                                                                                 BMI:   Wt Readings from Last 3 Encounters:   08/15/17 199 lb 3.2 oz (90.4 kg)   08/02/16 199 lb 6.4 oz (90.4 kg)   07/28/15 205 lb (93 kg)     There is no height or weight on file to calculate BMI.    CBC:   Lab Results   Component Value Date    WBC 5.1 08/15/2017    RBC 4.67 08/15/2017    HGB 14.1 08/15/2017    HCT 44.5 08/15/2017    MCV 95.2 08/15/2017    RDW 13.3 08/15/2017     08/15/2017       CMP:   Lab Results   Component Value Date     08/15/2017    K 4.0 08/15/2017     08/15/2017    CO2 21 08/15/2017    BUN 14 08/15/2017    CREATININE 0.61 08/15/2017    AGRATIO 1.0 08/15/2017    LABGLOM >60.0 08/15/2017    GLUCOSE 111 08/15/2017    PROT 8.6 08/15/2017    CALCIUM 9.7 08/15/2017    BILITOT 1.0 08/15/2017    ALKPHOS 75 08/15/2017    AST 26 08/15/2017    ALT 33 08/15/2017       POC Tests: No results for input(s): POCGLU, POCNA, POCK, POCCL, POCBUN, POCHEMO, POCHCT in the last 72 hours.     Coags: No results found for: PROTIME, INR, APTT    HCG (If Applicable): No results found for: PREGTESTUR, PREGSERUM, HCG, HCGQUANT     ABGs: No results found for: PHART, PO2ART, HAK5EZN, RGG6PQX, BEART, N6LGWBYJ     Type & Screen (If Applicable):  No results found for: LABABO, LABRH    Drug/Infectious Status (If Applicable):  No results found for: HIV, HEPCAB    COVID-19 Screening (If Applicable): No results found for: COVID19        Anesthesia Evaluation    Airway: Mallampati: II  TM distance: >3 FB   Neck ROM: full  Mouth opening: > = 3 FB Dental:    (+) upper dentures, lower dentures and partials      Pulmonary: breath sounds clear to auscultation      (-) COPD, asthma, sleep apnea and not a current smoker                           Cardiovascular:        (-) hypertension, past MI, CAD, CABG/stent, dysrhythmias,  angina,  CHF, orthopnea and PND      Rhythm: regular  Rate: normal           Beta Blocker:  Not on Beta Blocker         Neuro/Psych:      (-) seizures, TIA and CVA           GI/Hepatic/Renal:        (-) GERD, hepatitis and liver disease       Endo/Other:    (+) malignancy/cancer (breast ca 2008). (-) diabetes mellitus, hypothyroidism, hyperthyroidism               Abdominal:             Vascular:     - DVT and PE. Other Findings: Harish [place] and injured wrist            Anesthesia Plan      general and regional     ASA 2       Induction: intravenous. MIPS: Prophylactic antiemetics administered. Anesthetic plan and risks discussed with patient. Plan discussed with CRNA.                   Zack Colon MD   3/18/2022

## 2022-03-18 NOTE — H&P
I have reviewed the history and physical and examined the patient and find no relevant changes. I have reviewed with the patient and/or family the risks, benefits, and alternatives to the procedure.     Gem Rojas MD  3/18/2022

## 2022-03-18 NOTE — BRIEF OP NOTE
Brief Postoperative Note      Patient: Kathy Quiles  YOB: 1955  MRN: [id number]    Date of Procedure: 3/18/2022    Pre-Op Diagnosis: 1 Closed fracture of distal end of right radius, intra-articular two parts   2 Carpal tunnel syndrome on right [G56.01]    Post-Op Diagnosis: Same    Procedure:   1. Open reduction internal fixation of right intra-articular distal radius fracture two parts  2. Right carpal tunnel release  3.  Interpretation of fluoroscopy 3 views right wrist    Surgeon(s):  Mariam Homans, MD    Assistant:  Surgical Assistant: Khalida Shah    Anesthesia: General, regional    Estimated Blood Loss (mL): 96RH    Complications: None immediate apparent    Specimens:   none  Implants:  Biomet DVR crosslock distal radius locking plate with associated locking and nonlocking screws      Drains: none  Findings: see fully dictated operative report    Electronically signed by Sancho Mera MD on 3/18/2022 at 3:03 PM

## 2022-03-18 NOTE — PROGRESS NOTES
This RN took over care of pt from [address]. Pt alert; speech clear; breathing easily at rest. Dr. Delisa Mosquera administered right interscalene block; Versed 2 mg and Fentanyl 50 mcg given, and pt tolerated all well. Pt is currently awake and talking and  at bedside. Call light within reach. Ancef 2g IVPB will go to OR with pt.

## 2022-03-18 NOTE — ANESTHESIA PROCEDURE NOTES
Peripheral Block    Patient location during procedure: pre-op  Staffing  Performed: anesthesiologist   Anesthesiologist: Zack Colon MD  Preanesthetic Checklist  Completed: patient identified, IV checked, site marked, risks and benefits discussed, surgical consent, monitors and equipment checked, pre-op evaluation, timeout performed, anesthesia consent given, oxygen available and patient being monitored  Peripheral Block  Patient position: sitting  Prep: ChloraPrep  Patient monitoring: cardiac monitor, continuous pulse ox, frequent blood pressure checks and IV access  Block type: Brachial plexus  Laterality: right  Injection technique: single-shot  Guidance: ultrasound guided  Local infiltration: lidocaine  Infiltration strength: 1 %  Dose: 3 mL  Supraclavicular  Provider prep: mask and sterile gloves  Local infiltration: lidocaine  Needle  Needle gauge: 21 G  Needle length: 10 cm  Needle localization: ultrasound guidance  Assessment  Injection assessment: negative aspiration for heme, no paresthesia on injection and local visualized surrounding nerve on ultrasound  Paresthesia pain: none  Slow fractionated injection: yes  Hemodynamics: stable  Additional Notes  Immediately prior to procedure a \"time out\" was called to verify the correct patient, allergies, laterality, procedure and equipment. Time out performed with  RN    Local Anesthetic: 0.5 %  Ropivacaine   Amount: 30 ml  in 5 ml increments after negative aspiration each time. Anterior scalene and middle scalene muscles, 1st rib and pleura, brachial plexus (upper, middle and lower trunk) and Subclavian artery are identified, the tip of the needle and the spread of the local anesthetic around the brachial plexus are visualized. The Brachial plexus appeared to be anatomically normal and there were no abnormal pathologically findings seen.          Medications Administered  Ropivacaine (NAROPIN) injection 0.5%, 30 mL  Reason for block: post-op pain management and at surgeon's request

## 2022-03-18 NOTE — PROGRESS NOTES
Patient admitted to PACU #9 from OR per stretcher at 1505 s/p OPERATIVE FIXATION WITH OPEN REDUCTION INTERNAL FIXATION RIGHT DISTAL RADIUS FRACTURE;  RIGHT CARPAL TUNNEL RELEASE ANY INDICATED PROCEDURES (Right Wrist). Report received at bedside in PACU per CRNA. Patient was reported to be hemodynamically stable in OR with no complications. Patient received a pre-op supraclavicular nerve block. Patient connected to PACU monitoring equipment. IVF's infusing with site unremarkable. Patient arrived to PACU responsive but not fully wakeful from anesthesia but with respirations easy, even and regular with no pain noted or verbalized. RUE surgical dressing with splint, ace wrap and sling remains C,D,I with no drainage noted. RUE elevated on pillow upon arrival to PACU with ice pack placed on right lower arm. No further changes noted. Will continue to monitor.

## 2022-03-18 NOTE — H&P
Jus Cavazos    [id number]      Department of General Surgery    Surgical Services     Pre-operative History and Physical      INDICATION:   Closed fracture of distal end of right radius, unspecified fracture morphology, initial encounter [S52.501A]  Carpal tunnel syndrome on right [G56.01]    Procedure(s):  OPERATIVE FIXATION WITH OPEN REDUCTION INTERNAL FIXATION RIGHT DISTAL RADIUS FRACTURE;  RIGHT CARPAL TUNNEL RELEASE ANY INDICATED PROCEDURES  . History obtained from: Patient interview and EHR     HISTORY:   The patient is a 75 y.o. female with a past medical and surgical history as delineated below. Patient reports a mechanical fall with [address] on 3/15/22. She was seen in the ED where imaging demonstrates comminuted and displaced intra-articular distal radius fracture. She was provisionally splinted and referred to Dr. Wing Oneill and the patient presents today for the above procedure. Weight loss/gain:  No  Recent Hx Steroid use: No  Known anesthesia problems:  No  Recent history of abnormal bleeding: No  Remote history of abnormal bleeding:No  Covid 19:  Patient denies fever, chills, worsening cough, or known exposure to Covid-19. Patient reports she is fully vaccinated. Past Medical History:        Diagnosis Date    Breast cancer [facility])      Past Surgical History:        Procedure Laterality Date    APPENDECTOMY      BREAST BIOPSY      BREAST LUMPECTOMY         Medications Prior to Admission:   Prior to Admission medications    Medication Sig Start Date End Date Taking? Authorizing Provider   oxyCODONE (ROXICODONE) 5 MG immediate release tablet Take 1 tablet by mouth every 6 hours as needed (Pain not well managed by other medications) for up to 5 days.  3/16/22 3/21/22  MELLISSA Tirado   Wheat Dextrin (BENEFIBER) TABS Take 1 tablet by mouth daily    Historical Provider, MD   calcium carbonate 1500 (600 Ca) MG TABS tablet Take 1,500 mg by mouth 2 times daily    Historical Provider, MD   solifenacin (VESICARE) 5 MG tablet Take 10 mg by mouth daily    Historical Provider, MD       Allergies:  Patient has no known allergies. Social History:   Social History     Socioeconomic History    Marital status:      Spouse name: None    Number of children: None    Years of education: None    Highest education level: None   Occupational History    None   Tobacco Use    Smoking status: Never Smoker    Smokeless tobacco: Never Used   Substance and Sexual Activity    Alcohol use: None    Drug use: None    Sexual activity: None   Other Topics Concern    None   Social History Narrative    None     Social Determinants of Health     Financial Resource Strain:     Difficulty of Paying Living Expenses: Not on file   Food Insecurity:     Worried About Running Out of Food in the Last Year: Not on file    [place]ver[place] of Food in the Last Year: Not on file   Transportation Needs:     Lack of Transportation (Medical): Not on file    Lack of Transportation (Non-Medical):  Not on file   Physical Activity:     Days of Exercise per Week: Not on file    Minutes of Exercise per Session: Not on file   Stress:     Feeling of Stress : Not on file   Social Connections:     Frequency of Communication with Friends and Family: Not on file    Frequency of Social Gatherings with Friends and Family: Not on file    Attends Oriental orthodox Services: Not on file    Active Member of [address] [place] [address] or Organizations: Not on file    Attends Club or Organization Meetings: Not on file    Marital Status: Not on file   Intimate Partner Violence:     Fear of Current or Ex-Partner: Not on file    Emotionally Abused: Not on file    Physically Abused: Not on file    Sexually Abused: Not on file   Housing Stability:     Unable to Pay for Housing in the Last Year: Not on file    Number of [place] in the Last Year: Not on file    Unstable Housing in the Last Year: Not on file         Family History:       Problem Relation Age of Onset    Emphysema Mother     Alcohol Abuse Father          REVIEW OF SYSTEMS:    Constitutional: Negative for chills, fatigue and fever   HENT: Negative for loss of hearing   Eyes: Negative for visual disturbance  Respiratory: Negative for shortness of breath and wheezing    Cardiovascular: Negative for chest pain, palpitations and exertional chest pressure  Gastrointestinal: Negative for constipation, diarrhea, nausea and vomiting   Musculoskeletal: Negative for gait problem   Skin: Negative for rash and nonhealing wounds   Neurological: Negative for dizziness, syncope, light-headedness    Hematological: Does not bruise/bleed easily   Psychiatric/Behavioral: Negative for agitation    PHYSICAL EXAM:      BP (!) 148/89   Pulse 97   Temp 97.2 °F (36.2 °C) (Temporal)   Resp 11   Ht 5' 7\" (1.702 m)   SpO2 96%   BMI 31.20 kg/m²  I      Eyes:  pupils equal, round and reactive to light and conjunctiva normal    Head/ENT:  Normocephalic, without obvious abnormality, atramatic,     Neck:  Supple, symmetrical, trachea midline, no adenopathy, no tenderness, skin warm and dry. Heart: Regular rate and rhythm    Lungs:  No increased work of breathing, good air exchange, clear to auscultation bilaterally, no crackles or wheezing    Abdomen:  Soft, non-distended, non-tender, no masses palpated    Extremities:  No clubbing, cyanosis, or edema    Right wrist:  Patient in sling with sugar tong splint, brisk capillary refill, sensation to the fingers intact, able to wiggle fingers      ASSESSMENT AND PLAN:    1. Patient is a 75 y.o. female with above specified procedure planned. 2.  Access to ancillary services are available per request of the provider.     MALLORY Ruiz - MENDOZA   3/18/2022

## 2022-03-18 NOTE — PROGRESS NOTES
PACU Transfer to [facility]  Pt's Current Allergies: Patient has no known allergies. Pt meets criteria to transfer to next phase of care per Natalia Burgos and [facility] standards    No results for input(s): POCGLU in the last 72 hours. Vitals:    03/18/22 1615   BP: 115/78   Pulse: 75   Resp: 10   Temp: 98.9 °F (37.2 °C)   SpO2: 94%      BP within 20% of pt's admitting BP as per Kurtis Score      Intake/Output Summary (Last 24 hours) at 3/18/2022 1621  Last data filed at 3/18/2022 1612  Gross per 24 hour   Intake 1385 ml   Output 175 ml   Net 1210 ml         Pain assessment:  none  Pain Level: 0    Patient was assessed for unknown alterations to skin integrity. There were not unknown alterations observed. Belongings on stretcher.  in family waiting. Patient urinated in PACU. No further changes. Patient transferred to care of Homar Pedraza RN.    Family updated and directed to Homar Pedraza    3/18/2022 4:21 PM

## 2022-03-19 NOTE — OP NOTE
[address]                [address]                                OPERATIVE REPORT    PATIENT NAME: Ned Rosales                      :        1955  MED REC NO:   [id number]                          ROOM:  ACCOUNT NO:   [de-identified]                           ADMIT DATE: 2022  PROVIDER:     Barbi Marti MD    DATE OF PROCEDURE:  2022    LOCATION:  [facility], outpatient procedure. PREOPERATIVE DIAGNOSES:  1. Right displaced distal radius fracture intraarticular, two parts. 2.  Right median neurapraxia. POSTOPERATIVE DIAGNOSES:  1. Right displaced distal radius fracture intraarticular, two parts. 2.  Right median neurapraxia. OPERATIONS AND PROCEDURES PERFORMED:  1. Open reduction and internal fixation of right distal radius  fracture, intraarticular, two parts. 2.  Right carpal tunnel release. 3.  Interpretation of fluoroscopy, three views of the right wrist.    PRIMARY SURGEON:  Barbi Marti MD    ASSISTANT:  [facility] surgical assistant. ANESTHESIA:  Regional block, right upper extremity plus general.    IMPLANTS:  Biomet DVR Crosslock distal radius volar locking plate with  associated locking and nonlocking screws. ANTIBIOTICS:  Weight-based dose Ancef. BLOOD LOSS ESTIMATED:  15 mL. COMPLICATIONS:  None immediate apparent. SPECIMENS:  None. BRIEF HISTORY:  The patient is a 59-year-old female with a history of a  fall onto her right wrist.  She had significant pain and deformity. Closed reduction was attempted in the emergency department after images  demonstrated distal radius fracture. I did see the patient in the  clinic approximately 48 hours after her fracture and a repeat closed  reduction was performed improving alignment. The patient also had  evidence of median neurapraxia during my clinical examination.   We had  discussion regarding treatment options and elected to proceed with a  plan for operative fixation, stabilization of the displaced distal  radius fracture as well as right carpal tunnel release. Risks,  benefits, alternatives have been discussed with the patient prior to  procedure. The patient elected to proceed. Consent was obtained prior  to procedure. DESCRIPTION OF PROCEDURE:  The patient was seen in the preoperative  holding room by the operating surgeon. The operative site, right upper  extremity marked by the operating surgeon. Anesthesia also evaluated  the patient. Regional block, right upper extremity, was performed prior  to the procedure. The patient was then taken back to the operating  room, placed in the supine position on a regular table. All bony  prominences were well padded. Weight-based dose of IV antibiotics was  administered within one hour prior to the procedure. After prepping and  draping, we began the procedure by using an Esmarch bandage  exsanguinating the limb and elevating the tourniquet to 250 mmHg. We  first started with right carpal tunnel release. This was performed  through a separate incision within the palm using a 15 blade through  skin only. We carefully dissected down through the subcutaneous  tissues. The palmar fascia was identified and divided with a 15 blade. Transverse carpal ligament had evidence of injury and surrounding  bleeding, but this was carefully divided as well layer by layer with a  15 blade until the contents of the carpal tunnel were visualized. At  this point, combination of a Littler scissors as well as 15 blade was  used to release the transverse carpal ligament both proximally and  distally using a [place] elevator to protect the contents of carpal tunnel  at all times. There was swelling within the carpal tunnel and changes  consistent with median nerve compression.   Release was completed  proximally to the antebrachial fascia and then distally until the  sentinel fat distally was AO technique. These  were cortical nonlocking screws, taking care to measure appropriate  length screws. All screws distally were placed utilizing the standard  drill guides including the radial styloid screw. We felt that our fit  and positioning were appropriate with stabilization of the fracture. In  order to provisionally stabilize the fracture, we also placed a K-wire  from the radial styloid across the fracture with reduction and into the  shaft which was later removed. All drill guides were removed after  placement of screws and accounted for. Care was taken to measure the  appropriate length screws distally as well. At this point, there was  excellent alignment and stability of the fracture. Intraoperative  fluoroscopy confirmed alignment of the fracture and hardware with no  intraarticular hardware placement. Final fluoroscopic images in AP and  lateral joint line were all obtained. There was no evidence of DRUJ  instability in pronation, supination, neutral position. Motion of the  wrist intraoperatively was smooth and noncrepitant. There was no  dynamic scapholunate instability as well. At this point, the wound was copiously irrigated. Pronator quadratus  was laid over the hardware and several 4-0 Vicryl sutures were placed to  repair. Tourniquet had been deflated and hemostasis achieved after  irrigation. The skin and subcutaneous tissue were then closed with  Vicryl and nylon suture. There was a palpable radial pulse and brisk  capillary refill in all fingers after tourniquet was deflated. Compartments soft and compressible throughout the forearm. The incision  was then covered with Adaptic and a volar resting splint after a  well-padded splint was applied. The patient tolerated the procedure  well, was awoken, taken from sedation, taken to PACU in stable  condition. No apparent complications during the case.     ADDENDUM:  It should be noted that three views of the right wrist were  performed with mini C-arm fluoroscopy for the distal radius fracture. AP, lateral, and oblique views demonstrated satisfactory and appropriate  alignment of the fracture and hardware. Final films were saved,  printed. POSTOPERATIVE PLAN:  The patient will follow up in approximately 7 to 10  days in clinic for wound inspection, possible suture removal.  Hand  Therapy referral for short-arm clamshell brace. Begin early range of  motion of the wrist to include motion of the fingers and elbow. No  strengthening or lifting for six to eight weeks.         Mariel Rosa MD    D: 03/18/2022 15:40:49       T: 03/18/2022 15:44:35     ENID/[id number]  Job#: [id number]     Doc#: [id number]    CC:

## (undated) DEVICE — SOLUTION IV 1000ML 0.9% SOD CHL

## (undated) DEVICE — SINGLE USE DEVICE INTENDED TO COVER EXPOSED ENDS OF ORTHOPEDIC PIN AND K-WIRES TO HELP PROTECT THE EXPOSED WIRE FROM SNAGGING ON CLOTHING.: Brand: OXBORO™ PIN COVER

## (undated) DEVICE — SUTURE PDS II SZ 4-0 L18IN ABSRB UD L19MM PS-2 3/8 CIR PRIM Z496G

## (undated) DEVICE — COVER LT HNDL BLU PLAS

## (undated) DEVICE — COTTON UNDERCAST PADDING,CRIMPED FINISH: Brand: WEBRIL

## (undated) DEVICE — C-ARM PACK: Brand: C-ARM COVER

## (undated) DEVICE — BIT DRL DIA2.2MM CROSSLOCK MOD TY DVR ANAT VOLAR PLATING

## (undated) DEVICE — BANDAGE COMPR W1INXL5YD BGE E ADH TENSOPLAST

## (undated) DEVICE — GLOVE ORTHO 7 1/2   MSG9475

## (undated) DEVICE — HAND: Brand: MEDLINE INDUSTRIES, INC.

## (undated) DEVICE — 1010 S-DRAPE TOWEL DRAPE 10/BX: Brand: STERI-DRAPE™

## (undated) DEVICE — TOWEL,STOP FLAG GOLD N-W: Brand: MEDLINE

## (undated) DEVICE — TRAY,IRRIGATION,BULBSYRINGE,60ML,CSR,PVP: Brand: MEDLINE

## (undated) DEVICE — SYRINGE IRRIG 60ML SFT PLIABLE BLB EZ TO GRP 1 HND USE W/

## (undated) DEVICE — SUTURE ETHLN SZ 4-0 L18IN NONABSORBABLE BLK L19MM PS-2 3/8 1667H

## (undated) DEVICE — SUTURE VCRL SZ 4-0 L18IN ABSRB UD L19MM PS-2 3/8 CIR PRIM J496H

## (undated) DEVICE — PADDING,UNDERCAST,COTTON, 3X4YD STERILE: Brand: MEDLINE

## (undated) DEVICE — GOWN,SIRUS,POLYRNF,BRTHSLV,XLN/XL,20/CS: Brand: MEDLINE

## (undated) DEVICE — SCREW BNE L24MM DIA2.7MM CORT DST RAD NONLOCKING FULL THRD: Type: IMPLANTABLE DEVICE | Site: WRIST | Status: NON-FUNCTIONAL

## (undated) DEVICE — GARMENT,MEDLINE,DVT,INT,CALF,MED, GEN2: Brand: MEDLINE

## (undated) DEVICE — SHEET,DRAPE,53X77,STERILE: Brand: MEDLINE

## (undated) DEVICE — UNDERGLOVE SURG SZ 8 BLU LTX FREE SYN POLYISOPRENE POLYMER

## (undated) DEVICE — PADDING CAST W4INXL4YD HIGHLY ABSRB THAN COT EZ APPL

## (undated) DEVICE — SUTURE VCRL SZ 4-0 L27IN ABSRB UD L26MM SH 1/2 CIR J415H

## (undated) DEVICE — ELECTRODE PT RET AD L9FT HI MOIST COND ADH HYDRGEL CORDED

## (undated) DEVICE — BANDAGE COBAN 4 IN COMPR W4INXL5YD FOAM COHESIVE QUIK STK SELF ADH SFT

## (undated) DEVICE — COVER,TABLE,HEAVY DUTY,77"X90",STRL: Brand: MEDLINE

## (undated) DEVICE — BANDAGE,GAUZE,CONFORMING,3"X75",STRL,LF: Brand: MEDLINE